# Patient Record
Sex: MALE
[De-identification: names, ages, dates, MRNs, and addresses within clinical notes are randomized per-mention and may not be internally consistent; named-entity substitution may affect disease eponyms.]

---

## 2017-05-19 ENCOUNTER — CHARTING TRANS (OUTPATIENT)
Dept: OTHER | Age: 27
End: 2017-05-19

## 2017-12-04 ENCOUNTER — OFFICE VISIT (OUTPATIENT)
Dept: FAMILY MEDICINE CLINIC | Facility: CLINIC | Age: 27
End: 2017-12-04

## 2017-12-04 VITALS
HEIGHT: 75 IN | DIASTOLIC BLOOD PRESSURE: 80 MMHG | HEART RATE: 64 BPM | OXYGEN SATURATION: 98 % | WEIGHT: 225 LBS | BODY MASS INDEX: 27.98 KG/M2 | RESPIRATION RATE: 16 BRPM | SYSTOLIC BLOOD PRESSURE: 110 MMHG | TEMPERATURE: 98 F

## 2017-12-04 DIAGNOSIS — J01.00 ACUTE NON-RECURRENT MAXILLARY SINUSITIS: Primary | ICD-10-CM

## 2017-12-04 PROCEDURE — 99213 OFFICE O/P EST LOW 20 MIN: CPT | Performed by: FAMILY MEDICINE

## 2017-12-04 RX ORDER — AZITHROMYCIN 250 MG/1
TABLET, FILM COATED ORAL
Qty: 6 TABLET | Refills: 0 | Status: SHIPPED | OUTPATIENT
Start: 2017-12-04 | End: 2019-04-21 | Stop reason: ALTCHOICE

## 2017-12-04 NOTE — PROGRESS NOTES
Lisa Woodall is a 32year old male.    Patient presents with:  Sinus Problem: sinus drainage, pressure, ear pain x 3 days    HPI:    Symptoms started  5  days ago with purulent nasal discharge, maxillary sinus pressure, and headache, a lot of  post-nasa

## 2018-11-03 VITALS
HEART RATE: 62 BPM | WEIGHT: 215 LBS | HEIGHT: 75 IN | SYSTOLIC BLOOD PRESSURE: 104 MMHG | DIASTOLIC BLOOD PRESSURE: 76 MMHG | OXYGEN SATURATION: 97 % | TEMPERATURE: 98.2 F | BODY MASS INDEX: 26.73 KG/M2 | RESPIRATION RATE: 16 BRPM

## 2019-03-08 ENCOUNTER — PATIENT MESSAGE (OUTPATIENT)
Dept: FAMILY MEDICINE CLINIC | Facility: CLINIC | Age: 29
End: 2019-03-08

## 2019-03-08 NOTE — TELEPHONE ENCOUNTER
Left message to call back, preferably to visit the UC or ER in light of the nature of appointment booked via my chart on 3/13/19.

## 2019-04-21 ENCOUNTER — HOSPITAL ENCOUNTER (OUTPATIENT)
Age: 29
Discharge: HOME | End: 2019-04-21
Attending: FAMILY MEDICINE
Payer: COMMERCIAL

## 2019-04-21 VITALS
TEMPERATURE: 98 F | OXYGEN SATURATION: 97 % | DIASTOLIC BLOOD PRESSURE: 74 MMHG | SYSTOLIC BLOOD PRESSURE: 116 MMHG | HEART RATE: 64 BPM | RESPIRATION RATE: 20 BRPM

## 2019-04-21 DIAGNOSIS — R21 SKIN RASH: Primary | ICD-10-CM

## 2019-04-21 PROCEDURE — 99204 OFFICE O/P NEW MOD 45 MIN: CPT

## 2019-04-21 PROCEDURE — 99213 OFFICE O/P EST LOW 20 MIN: CPT

## 2019-04-21 RX ORDER — VALACYCLOVIR HYDROCHLORIDE 1 G/1
1 TABLET, FILM COATED ORAL EVERY 12 HOURS SCHEDULED
Qty: 20 TABLET | Refills: 0 | Status: SHIPPED | OUTPATIENT
Start: 2019-04-21 | End: 2019-05-01

## 2019-04-21 NOTE — ED INITIAL ASSESSMENT (HPI)
Pt states that yesterday he started having pain to his mid abd that wrapped around to his mid back. Pt states he started having a few red dots on his back like a rash and now he has the same red dots/rash on his mid abd. Pt states they are a little itchy.

## 2019-04-21 NOTE — ED PROVIDER NOTES
Patient Seen in: [facility]    History   Patient presents with:  Rash Skin Problem (integumentary)    Stated Complaint: RASH ON BELLY    68-year-old male comes in to get checked for rash.   Patient says he started to have so Hematological: Negative for adenopathy. Positive for stated complaint: RASH ON BELLY  Other systems are as noted in HPI. Constitutional and vital signs reviewed. All other systems reviewed and negative except as noted above.     Physical Exam Keep the area involved clean, avoid scratching or picking over the area. Close contact precautions  discussed. To avoid contact with small kids ,, elderly people, people with compromised immune system.   Follow with primary care physician or come back if

## 2019-05-01 ENCOUNTER — OFFICE VISIT (OUTPATIENT)
Dept: FAMILY MEDICINE CLINIC | Facility: CLINIC | Age: 29
End: 2019-05-01
Payer: COMMERCIAL

## 2019-05-01 ENCOUNTER — MED REC SCAN ONLY (OUTPATIENT)
Dept: FAMILY MEDICINE CLINIC | Facility: CLINIC | Age: 29
End: 2019-05-01

## 2019-05-01 VITALS
HEART RATE: 74 BPM | BODY MASS INDEX: 29.24 KG/M2 | TEMPERATURE: 98 F | SYSTOLIC BLOOD PRESSURE: 120 MMHG | DIASTOLIC BLOOD PRESSURE: 78 MMHG | WEIGHT: 223 LBS | RESPIRATION RATE: 14 BRPM | HEIGHT: 73.25 IN

## 2019-05-01 DIAGNOSIS — B02.9 HERPES ZOSTER WITHOUT COMPLICATION: Primary | ICD-10-CM

## 2019-05-01 PROCEDURE — 99213 OFFICE O/P EST LOW 20 MIN: CPT | Performed by: FAMILY MEDICINE

## 2019-05-01 RX ORDER — ASCORBIC ACID 500 MG
500 TABLET ORAL DAILY
COMMUNITY

## 2019-05-01 NOTE — PROGRESS NOTES
[facility] Family Medicine Office Note  Chief Complaint:   Patient presents with:  Shingles: f/u shingles, new pt      HPI:   This is a 25year old male coming in for follow up of shingles. Diagnosed on Easter Sunday. Started on valtrex.   Lalo Garrett calculated from the following:    Height as of this encounter: 73.25\". Weight as of this encounter: 223 lb. Vital signs reviewed. Appears stated age, well groomed.   Physical Exam:  GEN:  Patient is alert and oriented x3, no apparent distress  HEAD:  N

## 2020-08-28 ENCOUNTER — TELEPHONE (OUTPATIENT)
Dept: FAMILY MEDICINE CLINIC | Facility: CLINIC | Age: 30
End: 2020-08-28

## 2020-08-28 NOTE — TELEPHONE ENCOUNTER
Pt made the following [employer] Appt:    Appointment For: Anil Sanchez ([id number])   Visit Type: [address] [place] [address] ([id number])      9/2/2020    11:30 AM  15 mins. Riri Thomas DO   [facility]      Patient Comments:   Experiencing fatigue, digestive issu

## 2020-08-28 NOTE — TELEPHONE ENCOUNTER
Due to symptoms noted below, call to pt for additional info. Explained need more info to make sure existing appt date is appropriate. Pt reports approx 2 wk hx of intermittent dull mild headache and fatigue.    sts he has been working remotely and stares

## 2020-08-31 NOTE — TELEPHONE ENCOUNTER
Call to pt-advised of dr rebollar's comment/recommendation. Patient voices understanding/agrees with plan/no further questions.

## 2020-09-02 ENCOUNTER — OFFICE VISIT (OUTPATIENT)
Dept: FAMILY MEDICINE CLINIC | Facility: CLINIC | Age: 30
End: 2020-09-02

## 2020-09-02 VITALS
DIASTOLIC BLOOD PRESSURE: 80 MMHG | SYSTOLIC BLOOD PRESSURE: 130 MMHG | BODY MASS INDEX: 29.59 KG/M2 | HEART RATE: 74 BPM | WEIGHT: 238 LBS | TEMPERATURE: 98 F | HEIGHT: 75 IN | RESPIRATION RATE: 14 BRPM

## 2020-09-02 DIAGNOSIS — R53.83 FATIGUE, UNSPECIFIED TYPE: Primary | ICD-10-CM

## 2020-09-02 DIAGNOSIS — R06.83 SNORING: ICD-10-CM

## 2020-09-02 PROCEDURE — 3075F SYST BP GE 130 - 139MM HG: CPT | Performed by: FAMILY MEDICINE

## 2020-09-02 PROCEDURE — 99214 OFFICE O/P EST MOD 30 MIN: CPT | Performed by: FAMILY MEDICINE

## 2020-09-02 PROCEDURE — 3008F BODY MASS INDEX DOCD: CPT | Performed by: FAMILY MEDICINE

## 2020-09-02 PROCEDURE — 90715 TDAP VACCINE 7 YRS/> IM: CPT | Performed by: FAMILY MEDICINE

## 2020-09-02 PROCEDURE — 90471 IMMUNIZATION ADMIN: CPT | Performed by: FAMILY MEDICINE

## 2020-09-02 PROCEDURE — 3079F DIAST BP 80-89 MM HG: CPT | Performed by: FAMILY MEDICINE

## 2020-09-02 NOTE — PROGRESS NOTES
[facility] Family Medicine Office Note  Chief Complaint:   Patient presents with:  Fatigue: fatigue x 2 weeks      HPI:   This is a 30year old male coming in for fatigue. The patient has had for about two weeks.  Patient describes sx’s of being t (Temporal)   Resp 14   Ht 75\"   Wt 238 lb (108 kg)   BMI 29.75 kg/m²  Estimated body mass index is 29.75 kg/m² as calculated from the following:    Height as of this encounter: 75\". Weight as of this encounter: 238 lb (108 kg). Vital signs reviewed. worsening or changing symptoms. Patient is to call with any side effects or complications from the treatments as a result of today.      Problem List:  Patient Active Problem List:     Congestion of nasal sinus      MEREDITH KYLE, DO    Please note th

## 2022-01-03 ENCOUNTER — TELEMEDICINE (OUTPATIENT)
Dept: FAMILY MEDICINE CLINIC | Facility: CLINIC | Age: 32
End: 2022-01-03
Payer: COMMERCIAL

## 2022-01-03 DIAGNOSIS — J06.9 VIRAL UPPER RESPIRATORY INFECTION: Primary | ICD-10-CM

## 2022-01-03 DIAGNOSIS — Z20.822 ENCOUNTER BY TELEHEALTH FOR SUSPECTED COVID-19: ICD-10-CM

## 2022-01-03 PROCEDURE — 99213 OFFICE O/P EST LOW 20 MIN: CPT | Performed by: STUDENT IN AN ORGANIZED HEALTH CARE EDUCATION/TRAINING PROGRAM

## 2022-01-03 NOTE — PATIENT INSTRUCTIONS
Please call Central Scheduling at [phone] to schedule your test.    Coronavirus Disease 2019 (COVID-19)     [facility] is committed to the safety and well-being of our patients, members, employees, and communities.  As concerns arise abou date of last exposure  • After 10 days without testing from date of last exposure  • After day 7 from date of last exposure with a negative test result (test must occur on day 5 or later)  After stopping quarantine, you should  • Watch for symptoms until 1 sprays or wipes according to the label instructions.          Seek Further Care     If you are awaiting test results or are confirmed positive for COVID -19, and your symptoms worsen at home with symptoms such as: extreme weakness, difficult breathing, or u primary care provider or [facility] results. Post-Discharge Follow-up  If you are diagnosed with COVID, refrain from exercise until approved by your primary care provider.  Please call your primary care provider within 2 days of your discharge to [email or web address]. [email or web address]  Centers for Disease Control & Prevention (CDC)  10 things you can do to manage your health at home, Karli.nl. pdf  ht people    References:  Long haulers: Why some people experience long-term coronavirus symptoms. (2021, February 08). Retrieved March 17, 2021, from [email or web address]  Long-term effects of

## 2022-01-03 NOTE — PROGRESS NOTES
Visit for Respiratory Illness - Potential COVID-19 Infection  Subjective    This visit is conducted using Telemedicine with live, interactive video and audio.     Chief Complaint:  Concern for respiratory illness (including COVID-19 and influenza)    HPI: tylenol/ibuprofen. Obtain a pulse oximeter to monitor saturations. Hydration is important. Go to ER if chest pain, shortness of breath, dizziness or other concerns. Advised to call with questions or concerns.      Mr. Joe Canela advised to follow CDC guideline

## 2022-01-04 ENCOUNTER — NURSE ONLY (OUTPATIENT)
Dept: LAB | Facility: HOSPITAL | Age: 32
End: 2022-01-04
Attending: STUDENT IN AN ORGANIZED HEALTH CARE EDUCATION/TRAINING PROGRAM
Payer: COMMERCIAL

## 2022-01-04 DIAGNOSIS — Z20.822 ENCOUNTER BY TELEHEALTH FOR SUSPECTED COVID-19: ICD-10-CM

## 2022-01-04 DIAGNOSIS — J06.9 VIRAL UPPER RESPIRATORY INFECTION: ICD-10-CM

## 2022-01-07 LAB — SARS-COV-2 RNA RESP QL NAA+PROBE: NOT DETECTED

## 2022-05-09 ENCOUNTER — OFFICE VISIT (OUTPATIENT)
Dept: FAMILY MEDICINE CLINIC | Facility: CLINIC | Age: 32
End: 2022-05-09
Payer: COMMERCIAL

## 2022-05-09 VITALS
RESPIRATION RATE: 16 BRPM | HEIGHT: 75 IN | HEART RATE: 87 BPM | OXYGEN SATURATION: 98 % | TEMPERATURE: 99 F | WEIGHT: 250 LBS | BODY MASS INDEX: 31.08 KG/M2

## 2022-05-09 DIAGNOSIS — R39.9 UTI SYMPTOMS: Primary | ICD-10-CM

## 2022-05-09 PROCEDURE — 3008F BODY MASS INDEX DOCD: CPT | Performed by: NURSE PRACTITIONER

## 2022-05-09 PROCEDURE — 87086 URINE CULTURE/COLONY COUNT: CPT | Performed by: NURSE PRACTITIONER

## 2022-05-09 PROCEDURE — 99202 OFFICE O/P NEW SF 15 MIN: CPT | Performed by: NURSE PRACTITIONER

## 2022-05-09 RX ORDER — CIPROFLOXACIN 500 MG/1
500 TABLET, FILM COATED ORAL 2 TIMES DAILY
Qty: 14 TABLET | Refills: 0 | Status: SHIPPED | OUTPATIENT
Start: 2022-05-09 | End: 2022-05-16

## 2022-05-11 ENCOUNTER — HOSPITAL ENCOUNTER (EMERGENCY)
Age: 32
Discharge: HOME | End: 2022-05-11
Attending: EMERGENCY MEDICINE
Payer: COMMERCIAL

## 2022-05-11 ENCOUNTER — APPOINTMENT (OUTPATIENT)
Dept: CT IMAGING | Age: 32
End: 2022-05-11
Attending: EMERGENCY MEDICINE
Payer: COMMERCIAL

## 2022-05-11 VITALS
DIASTOLIC BLOOD PRESSURE: 89 MMHG | TEMPERATURE: 99 F | RESPIRATION RATE: 18 BRPM | BODY MASS INDEX: 31.08 KG/M2 | HEART RATE: 81 BPM | HEIGHT: 75 IN | OXYGEN SATURATION: 98 % | WEIGHT: 250 LBS | SYSTOLIC BLOOD PRESSURE: 120 MMHG

## 2022-05-11 DIAGNOSIS — K63.89 EPIPLOIC APPENDAGITIS: Primary | ICD-10-CM

## 2022-05-11 LAB
ALBUMIN SERPL-MCNC: 4 G/DL (ref 3.4–5)
ALBUMIN/GLOB SERPL: 1.1 {RATIO} (ref 1–2)
ALP LIVER SERPL-CCNC: 67 U/L
ALT SERPL-CCNC: 25 U/L
ANION GAP SERPL CALC-SCNC: 5 MMOL/L (ref 0–18)
AST SERPL-CCNC: 20 U/L (ref 15–37)
BASOPHILS # BLD AUTO: 0.04 X10(3) UL (ref 0–0.2)
BASOPHILS NFR BLD AUTO: 0.4 %
BILIRUB SERPL-MCNC: 0.5 MG/DL (ref 0.1–2)
BILIRUB UR QL STRIP.AUTO: NEGATIVE
BUN BLD-MCNC: 13 MG/DL (ref 7–18)
CALCIUM BLD-MCNC: 9.3 MG/DL (ref 8.5–10.1)
CHLORIDE SERPL-SCNC: 107 MMOL/L (ref 98–112)
CLARITY UR REFRACT.AUTO: CLEAR
CO2 SERPL-SCNC: 27 MMOL/L (ref 21–32)
COLOR UR AUTO: YELLOW
CREAT BLD-MCNC: 0.98 MG/DL
EOSINOPHIL # BLD AUTO: 0.06 X10(3) UL (ref 0–0.7)
EOSINOPHIL NFR BLD AUTO: 0.6 %
ERYTHROCYTE [DISTWIDTH] IN BLOOD BY AUTOMATED COUNT: 12.6 %
GLOBULIN PLAS-MCNC: 3.7 G/DL (ref 2.8–4.4)
GLUCOSE BLD-MCNC: 114 MG/DL (ref 70–99)
GLUCOSE UR STRIP.AUTO-MCNC: NEGATIVE MG/DL
HCT VFR BLD AUTO: 43.4 %
HGB BLD-MCNC: 14.9 G/DL
IMM GRANULOCYTES # BLD AUTO: 0.03 X10(3) UL (ref 0–1)
IMM GRANULOCYTES NFR BLD: 0.3 %
KETONES UR STRIP.AUTO-MCNC: NEGATIVE MG/DL
LEUKOCYTE ESTERASE UR QL STRIP.AUTO: NEGATIVE
LIPASE SERPL-CCNC: 125 U/L (ref 73–393)
LYMPHOCYTES # BLD AUTO: 2.48 X10(3) UL (ref 1–4)
LYMPHOCYTES NFR BLD AUTO: 26.6 %
MCH RBC QN AUTO: 29.7 PG (ref 26–34)
MCHC RBC AUTO-ENTMCNC: 34.3 G/DL (ref 31–37)
MCV RBC AUTO: 86.5 FL
MONOCYTES # BLD AUTO: 0.82 X10(3) UL (ref 0.1–1)
MONOCYTES NFR BLD AUTO: 8.8 %
NEUTROPHILS # BLD AUTO: 5.88 X10 (3) UL (ref 1.5–7.7)
NEUTROPHILS # BLD AUTO: 5.88 X10(3) UL (ref 1.5–7.7)
NEUTROPHILS NFR BLD AUTO: 63.3 %
NITRITE UR QL STRIP.AUTO: NEGATIVE
OSMOLALITY SERPL CALC.SUM OF ELEC: 289 MOSM/KG (ref 275–295)
PH UR STRIP.AUTO: 7.5 [PH] (ref 5–8)
PLATELET # BLD AUTO: 209 10(3)UL (ref 150–450)
POTASSIUM SERPL-SCNC: 3.5 MMOL/L (ref 3.5–5.1)
PROT SERPL-MCNC: 7.7 G/DL (ref 6.4–8.2)
PROT UR STRIP.AUTO-MCNC: NEGATIVE MG/DL
RBC # BLD AUTO: 5.02 X10(6)UL
RBC UR QL AUTO: NEGATIVE
SODIUM SERPL-SCNC: 139 MMOL/L (ref 136–145)
SP GR UR STRIP.AUTO: 1.02 (ref 1–1.03)
UROBILINOGEN UR STRIP.AUTO-MCNC: 0.2 MG/DL
WBC # BLD AUTO: 9.3 X10(3) UL (ref 4–11)

## 2022-05-11 PROCEDURE — 99284 EMERGENCY DEPT VISIT MOD MDM: CPT

## 2022-05-11 PROCEDURE — 83690 ASSAY OF LIPASE: CPT | Performed by: EMERGENCY MEDICINE

## 2022-05-11 PROCEDURE — 74177 CT ABD & PELVIS W/CONTRAST: CPT | Performed by: EMERGENCY MEDICINE

## 2022-05-11 PROCEDURE — 36415 COLL VENOUS BLD VENIPUNCTURE: CPT

## 2022-05-11 PROCEDURE — 85025 COMPLETE CBC W/AUTO DIFF WBC: CPT | Performed by: EMERGENCY MEDICINE

## 2022-05-11 PROCEDURE — 81003 URINALYSIS AUTO W/O SCOPE: CPT | Performed by: EMERGENCY MEDICINE

## 2022-05-11 PROCEDURE — 80053 COMPREHEN METABOLIC PANEL: CPT | Performed by: EMERGENCY MEDICINE

## 2022-05-11 RX ORDER — IOHEXOL 350 MG/ML
100 INJECTION, SOLUTION INTRAVENOUS
Status: COMPLETED | OUTPATIENT
Start: 2022-05-11 | End: 2022-05-11

## 2022-10-24 ENCOUNTER — OFFICE VISIT (OUTPATIENT)
Dept: FAMILY MEDICINE CLINIC | Facility: CLINIC | Age: 32
End: 2022-10-24
Payer: COMMERCIAL

## 2022-10-24 VITALS
TEMPERATURE: 98 F | OXYGEN SATURATION: 98 % | RESPIRATION RATE: 18 BRPM | WEIGHT: 240.38 LBS | BODY MASS INDEX: 30.85 KG/M2 | SYSTOLIC BLOOD PRESSURE: 120 MMHG | HEIGHT: 74 IN | DIASTOLIC BLOOD PRESSURE: 80 MMHG | HEART RATE: 76 BPM

## 2022-10-24 DIAGNOSIS — J01.00 ACUTE NON-RECURRENT MAXILLARY SINUSITIS: Primary | ICD-10-CM

## 2022-10-24 PROCEDURE — 3008F BODY MASS INDEX DOCD: CPT | Performed by: NURSE PRACTITIONER

## 2022-10-24 PROCEDURE — 3074F SYST BP LT 130 MM HG: CPT | Performed by: NURSE PRACTITIONER

## 2022-10-24 PROCEDURE — 3079F DIAST BP 80-89 MM HG: CPT | Performed by: NURSE PRACTITIONER

## 2022-10-24 PROCEDURE — 99213 OFFICE O/P EST LOW 20 MIN: CPT | Performed by: NURSE PRACTITIONER

## 2022-10-24 RX ORDER — FLUTICASONE PROPIONATE 50 MCG
1 SPRAY, SUSPENSION (ML) NASAL DAILY
Qty: 1 EACH | Refills: 0 | Status: SHIPPED | OUTPATIENT
Start: 2022-10-24 | End: 2022-11-07

## 2022-10-24 RX ORDER — AMOXICILLIN AND CLAVULANATE POTASSIUM 875; 125 MG/1; MG/1
1 TABLET, FILM COATED ORAL 2 TIMES DAILY
Qty: 20 TABLET | Refills: 0 | Status: SHIPPED | OUTPATIENT
Start: 2022-10-24 | End: 2022-11-03

## 2023-08-21 ENCOUNTER — OFFICE VISIT (OUTPATIENT)
Dept: FAMILY MEDICINE CLINIC | Facility: CLINIC | Age: 33
End: 2023-08-21
Payer: COMMERCIAL

## 2023-08-21 VITALS
RESPIRATION RATE: 16 BRPM | HEART RATE: 92 BPM | DIASTOLIC BLOOD PRESSURE: 86 MMHG | BODY MASS INDEX: 30.29 KG/M2 | SYSTOLIC BLOOD PRESSURE: 122 MMHG | TEMPERATURE: 98 F | WEIGHT: 236 LBS | OXYGEN SATURATION: 97 % | HEIGHT: 74 IN

## 2023-08-21 DIAGNOSIS — M54.42 ACUTE LEFT-SIDED LOW BACK PAIN WITH LEFT-SIDED SCIATICA: Primary | ICD-10-CM

## 2023-08-21 PROCEDURE — 3074F SYST BP LT 130 MM HG: CPT | Performed by: NURSE PRACTITIONER

## 2023-08-21 PROCEDURE — 3008F BODY MASS INDEX DOCD: CPT | Performed by: NURSE PRACTITIONER

## 2023-08-21 PROCEDURE — 3079F DIAST BP 80-89 MM HG: CPT | Performed by: NURSE PRACTITIONER

## 2023-08-21 PROCEDURE — 99213 OFFICE O/P EST LOW 20 MIN: CPT | Performed by: NURSE PRACTITIONER

## 2023-08-21 RX ORDER — CYCLOBENZAPRINE HCL 10 MG
10 TABLET ORAL 3 TIMES DAILY PRN
Qty: 20 TABLET | Refills: 0 | Status: SHIPPED | OUTPATIENT
Start: 2023-08-21 | End: 2023-09-04

## 2023-08-21 RX ORDER — METHYLPREDNISOLONE 4 MG/1
TABLET ORAL
Qty: 1 EACH | Refills: 0 | Status: SHIPPED | OUTPATIENT
Start: 2023-08-21

## 2023-11-28 ENCOUNTER — OFFICE VISIT (OUTPATIENT)
Dept: FAMILY MEDICINE CLINIC | Facility: CLINIC | Age: 33
End: 2023-11-28
Payer: COMMERCIAL

## 2023-11-28 VITALS
DIASTOLIC BLOOD PRESSURE: 84 MMHG | HEART RATE: 74 BPM | BODY MASS INDEX: 30.46 KG/M2 | RESPIRATION RATE: 16 BRPM | WEIGHT: 245 LBS | SYSTOLIC BLOOD PRESSURE: 138 MMHG | OXYGEN SATURATION: 98 % | HEIGHT: 75 IN | TEMPERATURE: 97 F

## 2023-11-28 DIAGNOSIS — J02.9 SORE THROAT: Primary | ICD-10-CM

## 2023-11-28 LAB
CONTROL LINE PRESENT WITH A CLEAR BACKGROUND (YES/NO): YES YES/NO
KIT LOT #: NORMAL NUMERIC
STREP GRP A CUL-SCR: NEGATIVE

## 2023-11-28 PROCEDURE — 99213 OFFICE O/P EST LOW 20 MIN: CPT | Performed by: NURSE PRACTITIONER

## 2023-11-28 PROCEDURE — 3008F BODY MASS INDEX DOCD: CPT | Performed by: NURSE PRACTITIONER

## 2023-11-28 PROCEDURE — 3079F DIAST BP 80-89 MM HG: CPT | Performed by: NURSE PRACTITIONER

## 2023-11-28 PROCEDURE — 87880 STREP A ASSAY W/OPTIC: CPT | Performed by: NURSE PRACTITIONER

## 2023-11-28 PROCEDURE — 3075F SYST BP GE 130 - 139MM HG: CPT | Performed by: NURSE PRACTITIONER

## 2023-11-28 RX ORDER — ESCITALOPRAM OXALATE 10 MG/1
10 TABLET ORAL DAILY
COMMUNITY

## 2023-11-28 NOTE — PATIENT INSTRUCTIONS
I recommend the 4 H's for inflammation:    1. Heat (warm mist from the shower or warm liquids such as tea)  2. Honey (mixed in your tea or by the spoonful [if you are not diabetic; over the age of 1 year]--take a spoonful 3 times a day and don't eat or drink anything for 15-20 minutes)  3. Humidity--cool mist in the bedroom at night  4.  Hydration --at least 8 -10 glasses a day     Benadryl at night if needed

## 2024-07-30 ENCOUNTER — HOSPITAL ENCOUNTER (EMERGENCY)
Age: 34
Discharge: HOME | End: 2024-07-30
Attending: EMERGENCY MEDICINE
Payer: COMMERCIAL

## 2024-07-30 ENCOUNTER — APPOINTMENT (OUTPATIENT)
Dept: GENERAL RADIOLOGY | Age: 34
End: 2024-07-30
Payer: COMMERCIAL

## 2024-07-30 VITALS
OXYGEN SATURATION: 98 % | HEIGHT: 74 IN | WEIGHT: 240 LBS | TEMPERATURE: 99 F | HEART RATE: 68 BPM | RESPIRATION RATE: 16 BRPM | DIASTOLIC BLOOD PRESSURE: 70 MMHG | SYSTOLIC BLOOD PRESSURE: 132 MMHG | BODY MASS INDEX: 30.8 KG/M2

## 2024-07-30 DIAGNOSIS — S92.902A CLOSED FRACTURE OF LEFT FOOT, INITIAL ENCOUNTER: ICD-10-CM

## 2024-07-30 DIAGNOSIS — S99.922A INJURY OF TOE ON LEFT FOOT, INITIAL ENCOUNTER: Primary | ICD-10-CM

## 2024-07-30 PROCEDURE — 99283 EMERGENCY DEPT VISIT LOW MDM: CPT

## 2024-07-30 PROCEDURE — 73660 X-RAY EXAM OF TOE(S): CPT | Performed by: EMERGENCY MEDICINE

## 2024-07-30 PROCEDURE — 28515 TREATMENT OF TOE FRACTURE: CPT

## 2024-07-30 NOTE — ED PROVIDER NOTES
Patient Seen in: [facility] [place]      History     Chief Complaint   Patient presents with    Foot Injury     Stated Complaint: Pt with left toe injury approx 30 mins ago.    Subjective:   HPI    33-year-old male presents to the ER after he inadvertently injured his third digit of his left foot.  The patient said he was running in the house when he kicked his foot against an object and he noticed deformity of the third digit.  Injury took place within a half an hour before arriving to the ER.  Reviewing his records he was initially seen on 11/28/2023 for sore throat.    Objective:   Past Medical History:    Anxiety              Past Surgical History:   Procedure Laterality Date    Removal of tonsils,10+ y/o      Repair ing hernia,3+y/o,reducibl                  Social History     Socioeconomic History    Marital status:    Tobacco Use    Smoking status: Never    Smokeless tobacco: Never   Vaping Use    Vaping status: Never Used   Substance and Sexual Activity    Alcohol use: Yes     Comment: weekends    Drug use: Yes     Types: Cannabis     Comment: weekly    Sexual activity: Yes              Review of Systems    Positive for stated Chief Complaint: Foot Injury    Other systems are as noted in HPI.  Constitutional and vital signs reviewed.      All other systems reviewed and negative except as noted above.    Physical Exam     ED Triage Vitals [07/30/24 1734]   /84   Pulse 66   Resp 16   Temp 98.6 °F (37 °C)   Temp src Temporal   SpO2 98 %   O2 Device None (Room air)       Current Vitals:   Vital Signs  BP: 137/84  Pulse: 66  Resp: 16  Temp: 98.6 °F (37 °C)  Temp src: Temporal    Oxygen Therapy  SpO2: 98 %  O2 Device: None (Room air)            Physical Exam  General: Nontoxic 33-year-old male appears to be no distress.  Focused exam of the patient's left foot: There is obvious deformity of the third digit.  There is no open wounds.  Capillary refills less than 2 seconds.   There are some discomfort noted at the DIP of the third digit.       ED Course   Labs Reviewed - No data to display        Narrative  PROCEDURE:  XR TOE(S) (MIN 2 VIEWS), LEFT 3RD (CPT=73660)     TECHNIQUE:  Three views were obtained.     COMPARISON:  None.     INDICATIONS:  Pt with left toe injury approx 30 mins ago.     PATIENT STATED HISTORY: (As transcribed by Technologist)  Pt was chasing his daughter and jammd his toe while running in his house. He c/o pain to the PIP joint of the 3rd digit.         FINDINGS:  An oblique fracture involves the 3rd proximal phalangeal shaft which extends to the proximal interphalangeal joint and is displaced by approximately 2 mm.  No dislocation.                  Impression  CONCLUSION:    1. Minimally displaced intra-articular fracture involves the 3rd proximal phalanx as detailed above.        LOCATION:  [facility]        Dictated by (CST): Diane Kay MD on 7/30/2024 at 6:35 PM      Finalized by (CST): Diane Kay MD on 7/30/2024 at 6:36 PM             MDM    Differential diagnosis includes but is not limited to fractured toe, dislocated toe, combination fracture dislocation.  X-rays were performed.  I reviewed the x-rays and agree with the radiologist report that showed minimally displaced intra-articular fracture involves the third proximal phalanx.  The patient's toe had a hematoma block performed.  I then slightly reduced the toe and placed him in yesy tapes with his primary toe in a postop shoe.  He will follow-up with orthopedics.  Take Tylenol ibuprofen for pain control  Note to Patient  The 21st Century Cures Act makes medical notes like these available to patients in the interest of transparency. However, be advised this is a medical document and is intended as uqne-kb-rfsw communication; it is written in medical language and may appear blunt, direct, or contain abbreviations or verbiage that are unfamiliar. Medical documents are intended to carry relevant information,  facts as evident, and the clinical opinion of the practitioner.  Patient was evaluated and a screening exam was performed.   As a treating physician attending to the patient, I determined, within reasonable clinical confidence and prior to discharge, that an emergency medical condition was not or was no longer present.  There was no indication for further evaluation, treatment or admission on an emergency basis.  Comprehensive verbal and written discharge and follow-up instructions were provided to help prevent relapse or worsening.  Patient was instructed to follow-up with their primary care provider for further evaluation and treatment, but to return immediately to the ER for worsening, concerning, new, changing or persisting symptoms.  I discussed the case with the patient and they had no questions, complaints, or concerns.  Patient felt comfortable going home.  ^^Please note that this report has been produced using speech recognition software and may contain errors related to that system including, but not limited to, errors in grammar, punctuation, and spelling, as well as words and phrases that possibly may have been recognized inappropriately.  If there are any questions or concerns, contact the dictating provider for clarification.                           Medical Decision Making      Disposition and Plan     Clinical Impression:  1. Injury of toe on left foot, initial encounter    2. Closed fracture of left foot, initial encounter         Disposition:  Discharge  7/30/2024  7:18 pm    Follow-up:  Mario Salcido DPM  [address] [address]  [phone]    Schedule an appointment as soon as possible for a visit in 2 day(s)            Medications Prescribed:  Current Discharge Medication List

## 2024-07-31 ENCOUNTER — TELEPHONE (OUTPATIENT)
Dept: PODIATRY CLINIC | Facility: CLINIC | Age: 34
End: 2024-07-31

## 2024-07-31 NOTE — TELEPHONE ENCOUNTER
Spoke with patient and gave him phone number to [facility] as we have no openings in time frame he should be seen at this time

## 2024-07-31 NOTE — TELEPHONE ENCOUNTER
Patient was seen at [facility] emergency room yesterday, has toe fracture, was advised to see podiatry in two days, no openings available. Please call thank you.

## 2024-08-02 ENCOUNTER — OFFICE VISIT (OUTPATIENT)
Dept: FAMILY MEDICINE CLINIC | Facility: CLINIC | Age: 34
End: 2024-08-02
Payer: COMMERCIAL

## 2024-08-02 VITALS
HEART RATE: 76 BPM | HEIGHT: 74 IN | OXYGEN SATURATION: 98 % | DIASTOLIC BLOOD PRESSURE: 80 MMHG | SYSTOLIC BLOOD PRESSURE: 128 MMHG | WEIGHT: 256 LBS | BODY MASS INDEX: 32.85 KG/M2

## 2024-08-02 DIAGNOSIS — Z00.00 LABORATORY EXAMINATION ORDERED AS PART OF A COMPLETE PHYSICAL EXAMINATION: ICD-10-CM

## 2024-08-02 DIAGNOSIS — F41.9 ANXIETY: ICD-10-CM

## 2024-08-02 DIAGNOSIS — Z00.00 WELLNESS EXAMINATION: Primary | ICD-10-CM

## 2024-08-02 DIAGNOSIS — S92.505A CLOSED NONDISPLACED FRACTURE OF PHALANX OF LESSER TOE OF LEFT FOOT, UNSPECIFIED PHALANX, INITIAL ENCOUNTER: ICD-10-CM

## 2024-08-02 NOTE — PROGRESS NOTES
HPI:   Steven Ruiz is a 33 year old male who presents for an Annual Health Visit.     No acute concerns.   Seen recently in ER for L foot 3rd proximal phalanx fracture. He is following with podiatry. He did have yesy splinting of the 5th toe. He is in surgical shoe for 3-4wks. Using tylenol/motrin. Able to ambulate.     Allergies:     Allergies   Allergen Reactions    Sulfa Antibiotics RASH       CURRENT MEDICATIONS   Current Outpatient Medications   Medication Sig Dispense Refill    escitalopram 10 MG Oral Tab Take 1 tablet (10 mg total) by mouth daily.        HISTORICAL INFORMATION   Past Medical History:    Anxiety      Past Surgical History:   Procedure Laterality Date    Hernia surgery      I was a baby    Removal of tonsils,10+ y/o      Repair ing hernia,3+y/o,reducibl      Tonsillectomy  2012      Family History   Problem Relation Age of Onset    Cancer Maternal Grandmother         Breast & skin cancer      SOCIAL HISTORY   Social History     Socioeconomic History    Marital status:    Tobacco Use    Smoking status: Never    Smokeless tobacco: Never   Vaping Use    Vaping status: Never Used   Substance and Sexual Activity    Alcohol use: Yes     Alcohol/week: 1.0 standard drink of alcohol     Types: 1 Cans of beer per week     Comment: weekends    Drug use: Yes     Frequency: 1.0 times per week     Types: Cannabis     Comment: weekly    Sexual activity: Yes   Other Topics Concern    Caffeine Concern No    Exercise No    Stress Concern Yes    Weight Concern Yes     Social History     Social History Narrative    Not on file        REVIEW OF SYSTEMS:     Constitutional: negative  Eyes: negative  ENT: negative  Respiratory: negative  Cardiovascular: negative  Gastrointestinal: negative  Integument/Breast: negative  Genitourinary: negative  Heme/Lymph: negative  Musculoskeletal:  see HPI  Neurological: negative  Psych: negative  Endocrine: negative  Allergic/Immune: negative    EXAM:   /80    Pulse 76   Ht 6' 2\" (1.88 m)   Wt 256 lb (116.1 kg)   SpO2 98%   BMI 32.87 kg/m²    Wt Readings from Last 6 Encounters:   08/02/24 256 lb (116.1 kg)   07/30/24 240 lb (108.9 kg)   11/28/23 245 lb (111.1 kg)   08/21/23 236 lb (107 kg)   10/24/22 240 lb 6.4 oz (109 kg)   05/11/22 250 lb (113.4 kg)     Body mass index is 32.87 kg/m².    General: alert, appears stated age, and cooperative  Head: Normocephalic, without obvious abnormality, atraumatic  Eyes: negative  Ears: normal TM's and external ear canals both ears  Nose: Nares normal. Septum midline. Mucosa normal. No drainage or sinus tenderness.  Throat: lips, mucosa, and tongue normal; teeth and gums normal  Neck: no adenopathy, supple, symmetrical, trachea midline, and thyroid not enlarged, symmetric, no tenderness/mass/nodules  Heart: S1, S2 normal, no murmur, click, rub or gallop, regular rate and rhythm  Lungs: clear to auscultation bilaterally  Chest wall: no tenderness  Abdomen: soft, non-tender; bowel sounds normal; no masses,  no organomegaly  : deferred  Back: negative  Extremities: extremities normal, atraumatic, no cyanosis or edema. Deferred foot exam as he was in his surgical shoe.   Pulses: 2+ and symmetric  Skin: Skin color, texture, turgor normal. No rashes or lesions  Lymph Nodes:  No LAD  Neurologic: Grossly normal    ASSESSMENT AND PLAN:   Steven was seen today for physical.    Diagnoses and all orders for this visit:    Wellness examination  -Immunizations: UTD   -Metabolic: BMI 32. BP wnl. Due for annual labs   -Cancer screening: UTD  -Communicable disease: low risk   -Mental health: no concerns   -Other preventative: follow with dentistry and optometry.   -Lifestyle: Follow a well balanced healthy diet with emphasis on fruits, vegetables, whole grains, lean meats. Limit processed and junk foods. Aim for at least 150 minutes of moderate intensity exercise weekly. Make sure you are staying adequately hydrated. Aim to get 7-9 hours of sleep  nightly.     Laboratory examination ordered as part of a complete physical examination  -     CBC With Differential With Platelet; Future  -     Comp Metabolic Panel (14); Future  -     Lipid Panel; Future  -     TSH and Free T4; Future    Anxiety  Stable, CPM    Closed nondisplaced fracture of phalanx of lesser toe of left foot, unspecified phalanx, initial encounter  Following with podiatry. Mgmt as per podiatry. Continue supportive care mgmt prn. F/u PRN.       Patient Instructions     Health Screening Guidelines, Men Ages 18 to 39   Screening tests and health counseling are a key part of managing your health. A screening test is done to find disorders or diseases in people who don't have any symptoms. Screening tests are not used to diagnose. They are used to find out if more testing is needed. The goal may be to find a disease early so it can be treated with more success. Or the goal may be to find a disease early so you can make lifestyle changes. You may need regular checkups to help you reduce your risk of disease.   Below are guidelines for men ages 18 to 39. Talk with your healthcare provider. Make sure you’re up-to-date on what you need.   We understand gender is a spectrum. We may use gendered terms to talk about anatomy and health risk. Please use this information in a way that works best for you and your provider as you talk about your care.   Screening  Who needs it  How often    Alcohol misuse All men in this age group  At routine exams   Blood pressure All men in this age group  Once a year if your blood pressure is normal. Normal blood pressure is less than 120/80 mm Hg. If your blood pressure is higher than this, follow the advice of your healthcare provider.    Prediabetes and type 2 diabetes  Men ages 35 to 70 who are overweight or obese  At least every 3 years (yearly if blood sugar has already started to rise)    Hepatitis C All men ages 18 to 79  At routine exams   High cholesterol or  triglycerides  All men ages 20 and older, and younger men at high risk for coronary artery disease.  At least every 5 years    HIV All men At routine exams   Obesity All men in this age group  At routine exams   Syphilis Men at higher risk for infection. Talk with your healthcare provider.  At routine exams   Tuberculosis Men at higher risk for infection. Talk with your healthcare provider.  Ask your healthcare provider    Vision All men in this age group  Every 5 to 10 years if no risk factors for eye disease    Health counseling  Who needs it  How often    Diet and exercise All men in this age group  At routine exams   Use of tobacco and the health effects it can cause  All men in this age group  Every visit   Sexually transmitted infection (STI) prevention  Men who are sexually active  At routine exams   Skin cancer All men in this age group  At routine exams. You may be reminded to avoid outdoor tanning and tanning beds.    [employer] last reviewed this educational content on 7/1/2022  © 4440-0578 [employer]. All rights reserved. This information is not intended as a substitute for professional medical care. Always follow your healthcare professional's instructions.          The patient indicates understanding of these issues and agrees to the plan.    Problem List:  Patient Active Problem List   Diagnosis    Congestion of nasal sinus    Anxiety       Nilo Felix MD  8/2/2024  1:16 PM

## 2024-08-02 NOTE — PATIENT INSTRUCTIONS
Health Screening Guidelines, Men Ages 18 to 39   Screening tests and health counseling are a key part of managing your health. A screening test is done to find disorders or diseases in people who don't have any symptoms. Screening tests are not used to diagnose. They are used to find out if more testing is needed. The goal may be to find a disease early so it can be treated with more success. Or the goal may be to find a disease early so you can make lifestyle changes. You may need regular checkups to help you reduce your risk of disease.   Below are guidelines for men ages 18 to 39. Talk with your healthcare provider. Make sure you’re up-to-date on what you need.   We understand gender is a spectrum. We may use gendered terms to talk about anatomy and health risk. Please use this information in a way that works best for you and your provider as you talk about your care.   Screening  Who needs it  How often    Alcohol misuse All men in this age group  At routine exams   Blood pressure All men in this age group  Once a year if your blood pressure is normal. Normal blood pressure is less than 120/80 mm Hg. If your blood pressure is higher than this, follow the advice of your healthcare provider.    Prediabetes and type 2 diabetes  Men ages 35 to 70 who are overweight or obese  At least every 3 years (yearly if blood sugar has already started to rise)    Hepatitis C All men ages 18 to 79  At routine exams   High cholesterol or triglycerides  All men ages 20 and older, and younger men at high risk for coronary artery disease.  At least every 5 years    HIV All men At routine exams   Obesity All men in this age group  At routine exams   Syphilis Men at higher risk for infection. Talk with your healthcare provider.  At routine exams   Tuberculosis Men at higher risk for infection. Talk with your healthcare provider.  Ask your healthcare provider    Vision All men in this age group  Every 5 to 10 years if no risk factors  for eye disease    Health counseling  Who needs it  How often    Diet and exercise All men in this age group  At routine exams   Use of tobacco and the health effects it can cause  All men in this age group  Every visit   Sexually transmitted infection (STI) prevention  Men who are sexually active  At routine exams   Skin cancer All men in this age group  At routine exams. You may be reminded to avoid outdoor tanning and tanning beds.    [employer] last reviewed this educational content on 7/1/2022 © 2000-2023 [employer]. All rights reserved. This information is not intended as a substitute for professional medical care. Always follow your healthcare professional's instructions.

## 2024-12-23 ENCOUNTER — LABORATORY ENCOUNTER (OUTPATIENT)
Dept: LAB | Age: 34
End: 2024-12-23
Attending: FAMILY MEDICINE
Payer: COMMERCIAL

## 2024-12-23 DIAGNOSIS — Z00.00 LABORATORY EXAMINATION ORDERED AS PART OF A COMPLETE PHYSICAL EXAMINATION: ICD-10-CM

## 2024-12-23 LAB
ALBUMIN SERPL-MCNC: 4.3 G/DL (ref 3.2–4.8)
ALBUMIN/GLOB SERPL: 1.4 {RATIO} (ref 1–2)
ALP LIVER SERPL-CCNC: 61 U/L
ALT SERPL-CCNC: 34 U/L
ANION GAP SERPL CALC-SCNC: 6 MMOL/L (ref 0–18)
AST SERPL-CCNC: 33 U/L (ref ?–34)
BASOPHILS # BLD AUTO: 0.04 X10(3) UL (ref 0–0.2)
BASOPHILS NFR BLD AUTO: 0.4 %
BILIRUB SERPL-MCNC: 0.6 MG/DL (ref 0.3–1.2)
BUN BLD-MCNC: 15 MG/DL (ref 9–23)
CALCIUM BLD-MCNC: 9.5 MG/DL (ref 8.7–10.4)
CHLORIDE SERPL-SCNC: 109 MMOL/L (ref 98–112)
CHOLEST SERPL-MCNC: 162 MG/DL (ref ?–200)
CO2 SERPL-SCNC: 25 MMOL/L (ref 21–32)
CREAT BLD-MCNC: 1.02 MG/DL
EGFRCR SERPLBLD CKD-EPI 2021: 98 ML/MIN/1.73M2 (ref 60–?)
EOSINOPHIL # BLD AUTO: 0.21 X10(3) UL (ref 0–0.7)
EOSINOPHIL NFR BLD AUTO: 2.2 %
ERYTHROCYTE [DISTWIDTH] IN BLOOD BY AUTOMATED COUNT: 12.7 %
FASTING PATIENT LIPID ANSWER: YES
FASTING STATUS PATIENT QL REPORTED: YES
GLOBULIN PLAS-MCNC: 3 G/DL (ref 2–3.5)
GLUCOSE BLD-MCNC: 85 MG/DL (ref 70–99)
HCT VFR BLD AUTO: 43.7 %
HDLC SERPL-MCNC: 49 MG/DL (ref 40–59)
HGB BLD-MCNC: 15.1 G/DL
IMM GRANULOCYTES # BLD AUTO: 0.01 X10(3) UL (ref 0–1)
IMM GRANULOCYTES NFR BLD: 0.1 %
LDLC SERPL CALC-MCNC: 91 MG/DL (ref ?–100)
LYMPHOCYTES # BLD AUTO: 1.89 X10(3) UL (ref 1–4)
LYMPHOCYTES NFR BLD AUTO: 19.4 %
MCH RBC QN AUTO: 30 PG (ref 26–34)
MCHC RBC AUTO-ENTMCNC: 34.6 G/DL (ref 31–37)
MCV RBC AUTO: 86.9 FL
MONOCYTES # BLD AUTO: 0.82 X10(3) UL (ref 0.1–1)
MONOCYTES NFR BLD AUTO: 8.4 %
NEUTROPHILS # BLD AUTO: 6.75 X10 (3) UL (ref 1.5–7.7)
NEUTROPHILS # BLD AUTO: 6.75 X10(3) UL (ref 1.5–7.7)
NEUTROPHILS NFR BLD AUTO: 69.5 %
NONHDLC SERPL-MCNC: 113 MG/DL (ref ?–130)
OSMOLALITY SERPL CALC.SUM OF ELEC: 290 MOSM/KG (ref 275–295)
PLATELET # BLD AUTO: 236 10(3)UL (ref 150–450)
POTASSIUM SERPL-SCNC: 3.9 MMOL/L (ref 3.5–5.1)
PROT SERPL-MCNC: 7.3 G/DL (ref 5.7–8.2)
RBC # BLD AUTO: 5.03 X10(6)UL
SODIUM SERPL-SCNC: 140 MMOL/L (ref 136–145)
T4 FREE SERPL-MCNC: 1.3 NG/DL (ref 0.8–1.7)
TRIGL SERPL-MCNC: 125 MG/DL (ref 30–149)
TSI SER-ACNC: 1.45 UIU/ML (ref 0.55–4.78)
VLDLC SERPL CALC-MCNC: 20 MG/DL (ref 0–30)
WBC # BLD AUTO: 9.7 X10(3) UL (ref 4–11)

## 2024-12-23 PROCEDURE — 80050 GENERAL HEALTH PANEL: CPT | Performed by: FAMILY MEDICINE

## 2024-12-23 PROCEDURE — 80061 LIPID PANEL: CPT | Performed by: FAMILY MEDICINE

## 2024-12-23 PROCEDURE — 84439 ASSAY OF FREE THYROXINE: CPT | Performed by: FAMILY MEDICINE

## 2025-05-14 ENCOUNTER — TELEPHONE (OUTPATIENT)
Age: 35
End: 2025-05-14

## 2025-05-14 ENCOUNTER — TELEPHONE (OUTPATIENT)
Dept: FAMILY MEDICINE CLINIC | Facility: CLINIC | Age: 35
End: 2025-05-14

## 2025-05-14 NOTE — TELEPHONE ENCOUNTER
Hello,    Sorry I missed you - I am reaching out from the [facility] Navigation department, following up on an order from your provider's office to assist in connecting you with resources for care. If you would like to discuss this further, please give us a call back at [phone], or for more immediate assistance you can contact our 24-hour help line at [phone]. We look forward to hearing from you soon.

## 2025-05-15 NOTE — TELEPHONE ENCOUNTER
Called pharmacy and confirmed prior auth required for Adderall.   Confirmed pharmacy benefits match chart.  Prior auth per CoverMyMeds: Additional Information Required  [employer] is unable to process your request electronically. We apologize for the inconvenience. Please obtain the appropriate prior authorization form from the following link and fax it directly to [employer] for processing: [email or web address]. For additional assistance, please call [phone].    Prior auth form for Adderall obtained from [employer] website as advised.   Completed and faxed to [phone] with chart notes.

## 2025-06-07 ENCOUNTER — TELEPHONE (OUTPATIENT)
Age: 35
End: 2025-06-07

## 2025-06-07 NOTE — TELEPHONE ENCOUNTER
Hello,  Sorry I missed you - I am reaching out from the [facility] Navigation department, following up on an order from your provider's office to assist in connecting you with resources for care. If you would like to discuss this further, please give us a call back at [phone], or for more immediate assistance you can contact our 24-hour help line at [phone] We look forward to hearing from you soon.

## (undated) NOTE — LETTER
Date & Time: 4/24/2019, 11:51 AM  Patient: Estelita Fang  Encounter Provider(s): Jimmy Pan MD       To Whom It May Concern:    Will Alejandra was seen and treated in our department on 4/21/2019.   He may return to work as long as his [address]

## (undated) NOTE — LETTER
Date: 5/1/2019    Patient Name: Reanna Leal          To Whom it may concern: This letter has been written at the patient's request. The above patient was seen at the [facility] for treatment of a medical condition.   Patient was recently